# Patient Record
Sex: FEMALE | Race: WHITE | NOT HISPANIC OR LATINO
[De-identification: names, ages, dates, MRNs, and addresses within clinical notes are randomized per-mention and may not be internally consistent; named-entity substitution may affect disease eponyms.]

---

## 2017-09-06 ENCOUNTER — TRANSCRIPTION ENCOUNTER (OUTPATIENT)
Age: 67
End: 2017-09-06

## 2017-09-17 ENCOUNTER — TRANSCRIPTION ENCOUNTER (OUTPATIENT)
Age: 67
End: 2017-09-17

## 2018-10-17 ENCOUNTER — OUTPATIENT (OUTPATIENT)
Dept: OUTPATIENT SERVICES | Facility: HOSPITAL | Age: 68
LOS: 1 days | End: 2018-10-17

## 2018-10-26 ENCOUNTER — OUTPATIENT (OUTPATIENT)
Dept: OUTPATIENT SERVICES | Facility: HOSPITAL | Age: 68
LOS: 1 days | End: 2018-10-26

## 2018-12-14 ENCOUNTER — OUTPATIENT (OUTPATIENT)
Dept: OUTPATIENT SERVICES | Facility: HOSPITAL | Age: 68
LOS: 1 days | End: 2018-12-14

## 2018-12-14 ENCOUNTER — RESULT REVIEW (OUTPATIENT)
Age: 68
End: 2018-12-14

## 2018-12-31 PROBLEM — Z00.00 ENCOUNTER FOR PREVENTIVE HEALTH EXAMINATION: Status: ACTIVE | Noted: 2018-12-31

## 2019-01-22 ENCOUNTER — OUTPATIENT (OUTPATIENT)
Dept: OUTPATIENT SERVICES | Facility: HOSPITAL | Age: 69
LOS: 1 days | End: 2019-01-22

## 2019-02-11 ENCOUNTER — OUTPATIENT (OUTPATIENT)
Dept: OUTPATIENT SERVICES | Facility: HOSPITAL | Age: 69
LOS: 1 days | End: 2019-02-11

## 2019-10-30 ENCOUNTER — OUTPATIENT (OUTPATIENT)
Dept: OUTPATIENT SERVICES | Facility: HOSPITAL | Age: 69
LOS: 1 days | End: 2019-10-30
Payer: MEDICARE

## 2019-10-30 PROCEDURE — 74018 RADEX ABDOMEN 1 VIEW: CPT | Mod: 26

## 2019-10-30 PROCEDURE — 76856 US EXAM PELVIC COMPLETE: CPT | Mod: 26

## 2019-10-30 PROCEDURE — 76770 US EXAM ABDO BACK WALL COMP: CPT | Mod: 26

## 2020-02-25 ENCOUNTER — OUTPATIENT (OUTPATIENT)
Dept: OUTPATIENT SERVICES | Facility: HOSPITAL | Age: 70
LOS: 1 days | End: 2020-02-25
Payer: MEDICARE

## 2020-02-25 PROCEDURE — 74176 CT ABD & PELVIS W/O CONTRAST: CPT | Mod: 26

## 2021-07-15 ENCOUNTER — APPOINTMENT (OUTPATIENT)
Dept: OPHTHALMOLOGY | Facility: CLINIC | Age: 71
End: 2021-07-15

## 2021-07-22 ENCOUNTER — APPOINTMENT (OUTPATIENT)
Dept: ULTRASOUND IMAGING | Facility: CLINIC | Age: 71
End: 2021-07-22
Payer: MEDICARE

## 2021-07-22 ENCOUNTER — APPOINTMENT (OUTPATIENT)
Dept: RADIOLOGY | Facility: CLINIC | Age: 71
End: 2021-07-22

## 2021-07-22 PROCEDURE — 76770 US EXAM ABDO BACK WALL COMP: CPT

## 2021-07-22 PROCEDURE — 74018 RADEX ABDOMEN 1 VIEW: CPT

## 2021-08-11 ENCOUNTER — NON-APPOINTMENT (OUTPATIENT)
Age: 71
End: 2021-08-11

## 2021-08-11 ENCOUNTER — APPOINTMENT (OUTPATIENT)
Dept: OPHTHALMOLOGY | Facility: CLINIC | Age: 71
End: 2021-08-11
Payer: MEDICARE

## 2021-08-11 PROCEDURE — 99214 OFFICE O/P EST MOD 30 MIN: CPT

## 2021-08-11 PROCEDURE — 92134 CPTRZ OPH DX IMG PST SGM RTA: CPT

## 2021-08-27 ENCOUNTER — NON-APPOINTMENT (OUTPATIENT)
Age: 71
End: 2021-08-27

## 2021-08-27 ENCOUNTER — APPOINTMENT (OUTPATIENT)
Dept: OPHTHALMOLOGY | Facility: CLINIC | Age: 71
End: 2021-08-27
Payer: MEDICARE

## 2021-08-27 PROCEDURE — 92015 DETERMINE REFRACTIVE STATE: CPT

## 2021-09-17 ENCOUNTER — NON-APPOINTMENT (OUTPATIENT)
Age: 71
End: 2021-09-17

## 2021-09-17 ENCOUNTER — APPOINTMENT (OUTPATIENT)
Dept: OPHTHALMOLOGY | Facility: CLINIC | Age: 71
End: 2021-09-17
Payer: MEDICARE

## 2021-09-17 PROCEDURE — 92083 EXTENDED VISUAL FIELD XM: CPT

## 2021-09-17 PROCEDURE — 92133 CPTRZD OPH DX IMG PST SGM ON: CPT

## 2021-09-17 PROCEDURE — ZZZZZ: CPT

## 2021-09-23 ENCOUNTER — APPOINTMENT (OUTPATIENT)
Dept: RADIOLOGY | Facility: CLINIC | Age: 71
End: 2021-09-23
Payer: MEDICARE

## 2021-09-23 ENCOUNTER — APPOINTMENT (OUTPATIENT)
Dept: ULTRASOUND IMAGING | Facility: CLINIC | Age: 71
End: 2021-09-23
Payer: MEDICARE

## 2021-09-23 PROCEDURE — 76770 US EXAM ABDO BACK WALL COMP: CPT

## 2021-09-23 PROCEDURE — 74018 RADEX ABDOMEN 1 VIEW: CPT

## 2021-10-08 ENCOUNTER — APPOINTMENT (OUTPATIENT)
Dept: OPHTHALMOLOGY | Facility: CLINIC | Age: 71
End: 2021-10-08
Payer: MEDICARE

## 2021-10-08 ENCOUNTER — NON-APPOINTMENT (OUTPATIENT)
Age: 71
End: 2021-10-08

## 2021-10-08 PROCEDURE — 92014 COMPRE OPH EXAM EST PT 1/>: CPT

## 2021-10-08 PROCEDURE — 92134 CPTRZ OPH DX IMG PST SGM RTA: CPT

## 2021-10-28 ENCOUNTER — APPOINTMENT (OUTPATIENT)
Dept: MAMMOGRAPHY | Facility: CLINIC | Age: 71
End: 2021-10-28
Payer: MEDICARE

## 2021-10-28 ENCOUNTER — APPOINTMENT (OUTPATIENT)
Dept: ULTRASOUND IMAGING | Facility: CLINIC | Age: 71
End: 2021-10-28

## 2021-10-28 PROCEDURE — 76641 ULTRASOUND BREAST COMPLETE: CPT | Mod: 50

## 2021-10-28 PROCEDURE — 77066 DX MAMMO INCL CAD BI: CPT

## 2021-10-28 PROCEDURE — G0279: CPT

## 2021-11-10 ENCOUNTER — APPOINTMENT (OUTPATIENT)
Age: 71
End: 2021-11-10
Payer: MEDICARE

## 2021-11-10 VITALS
DIASTOLIC BLOOD PRESSURE: 94 MMHG | HEIGHT: 65 IN | SYSTOLIC BLOOD PRESSURE: 178 MMHG | WEIGHT: 161.05 LBS | TEMPERATURE: 97 F | BODY MASS INDEX: 26.83 KG/M2 | HEART RATE: 56 BPM | OXYGEN SATURATION: 97 %

## 2021-11-10 DIAGNOSIS — Z87.39 PERSONAL HISTORY OF OTHER DISEASES OF THE MUSCULOSKELETAL SYSTEM AND CONNECTIVE TISSUE: ICD-10-CM

## 2021-11-10 DIAGNOSIS — Z80.0 FAMILY HISTORY OF MALIGNANT NEOPLASM OF DIGESTIVE ORGANS: ICD-10-CM

## 2021-11-10 PROCEDURE — 99203 OFFICE O/P NEW LOW 30 MIN: CPT

## 2021-11-10 RX ORDER — UBIDECARENONE/VIT E ACET 100MG-5
CAPSULE ORAL
Refills: 0 | Status: ACTIVE | COMMUNITY

## 2021-11-10 RX ORDER — FOLIC ACID 20 MG
CAPSULE ORAL
Refills: 0 | Status: ACTIVE | COMMUNITY

## 2021-11-17 ENCOUNTER — APPOINTMENT (OUTPATIENT)
Dept: BREAST CENTER | Facility: CLINIC | Age: 71
End: 2021-11-17
Payer: MEDICARE

## 2021-11-17 VITALS
WEIGHT: 160 LBS | TEMPERATURE: 98 F | HEIGHT: 65 IN | BODY MASS INDEX: 26.66 KG/M2 | SYSTOLIC BLOOD PRESSURE: 139 MMHG | DIASTOLIC BLOOD PRESSURE: 90 MMHG | HEART RATE: 56 BPM

## 2021-11-17 PROCEDURE — 10005 FNA BX W/US GDN 1ST LES: CPT

## 2021-11-17 NOTE — PROCEDURE
[FreeTextEntry1] : L Cyst Aspiration [FreeTextEntry2] : Tender/palpable Cyst [FreeTextEntry3] : Cyst aspiration of L breast. Procedure and consent reviewed and signed. Pt agrees to proceed. U/s reveals 1.8 x1.1 cm simple cyst. Using 22 gauge needle 2mL lidocaine given and aspiration performed under u/s guidance with resolution of cyst. Total removed: L 2mL whitish, non suspicious fluid.  Given complete resolution of the cyst and not suspicious, fluid discarded. No complications and pt tolerated procedure well. Excellent hemostasis. Bandaid applied. Pt to f/u if cyst recurs or sooner as needed.

## 2021-11-17 NOTE — PAST MEDICAL HISTORY
[Menopause Age____] : age at menopause was [unfilled] [Total Preg ___] : G[unfilled] [Age At Live Birth ___] : Age at live birth: [unfilled] [Menarche Age ____] : age at menarche was [unfilled] [Live Births ___] : P[unfilled]  [Living ___] : Living: [unfilled] [History of Hormone Replacement Treatment] : has no history of hormone replacement treatment [FreeTextEntry6] : none [FreeTextEntry7] : Yes - ~6 years [FreeTextEntry8] : Yes - 15 months

## 2021-11-17 NOTE — ASSESSMENT
[FreeTextEntry1] : Pt is a 71 year old female referred for consultation by Ivonne Heath MD (PCP) here today for aspiration of L palpable cyst. She has had this before about 3 times in the last 20 years.  She desired aspiration given it is palpable although it has decreased in size since u/s.  \par \par 10/28/21, Kennedi Arriola dMMG: FG, no susp findings, multiple well-circumscribed masses seen in L breast, palpable lump UOQ L caleb to a 2.5cm well-circumscribed mass prev demonstrated on prior mmgs (comp to 2014 and 2018); Bilat U/S: R no susp findings, L no susp findings, 2.7cm cyst 2:00 caleb to palpable lump, rec mmg 1 year, BR2\par \par Fhx: AJ. Breast Ca - mGrandma at 75 and Mom w/ Colon Ca at 89\par Left u/s asp performed of cyst with complete resolution, 2 ml of fluid, not suspicious and discarded.\par D/c'd with pt, cyst may reform. Rec 6 mos f.u u/s, sooner if reforms sooner. \par \par

## 2021-11-17 NOTE — ASSESSMENT
[FreeTextEntry1] : Pt is a 71 year old female referred for consultation by Ivonne Heath MD (PCP) for L palpable cyst. She has had this before about 3 times in the last 20 years. She states that since she noticed it it has gotten smaller and she has not needed to take anything for the pain/discomfort. \par \par 10/28/21, Kennedi Arriola dMMG: FG, no susp findings, multiple well-circumscribed masses seen in L breast, palpable lump UOQ L caleb to a 2.5cm well-circumscribed mass prev demonstrated on prior mmgs (comp to 2014 and 2018); Bilat U/S: R no susp findings, L no susp findings, 2.7cm cyst 2:00 caleb to palpable lump, rec mmg 1 year, BR2\par \par Fhx: AJ. Breast Ca - mGrandma at 75 and Mom w/ Colon Ca at 89\par \par CBE: R negative, L UOQ lump palpable corresponding to cyst. No adenopathy. \par Reviewed recent imaging and CBE -left breast cyst, pt has had for years but occasionally flares up. Now decreased. Options of observation vs aspiration discussed. Pt opts for asp. Will do under u/s guidance given slightly deep on palpation. Discussed if aspirate is suspicious or indeterminate, may send for cytology. We will have pt f/u next Wed in [place] office for U/S guided cyst aspiration at pt's request. Pt meets NCC[facility] guidelines for MYRIAD genetic testing and pt accepts testing - [facility]AD sent today.

## 2021-11-17 NOTE — HISTORY OF PRESENT ILLNESS
[FreeTextEntry1] : Pt is a 71 year old female referred for consultation by Ivonne Heath MD (PCP) for L palpable cyst. She has had this before about 3 times in the last 20 years. She states that since she noticed it it has gotten smaller and she has not needed to take anything for the pain/discomfort. \par \par 10/28/21, Kennedi Arriola dMMG: FG, no susp findings, multiple well-circumscribed masses seen in L breast, palpable lump UOQ L caleb to a 2.5cm well-circumscribed mass prev demonstrated on prior mmgs (comp to 2014 and 2018); Kennedi U/S: R no susp findings, L no susp findings, 2.7cm cyst 2:00 caleb to palpable lump, rec mmg 1 year, BR2\par \par Fhx: AJ. Breast Ca - mGrandma at 75 and Mom w/ Colon Ca at 89\par \par

## 2021-11-17 NOTE — CONSULT LETTER
[Dear  ___] : Dear  [unfilled], [Courtesy Letter:] : I had the pleasure of seeing your patient, [unfilled], in my office today. [Please see my note below.] : Please see my note below. [Consult Closing:] : Thank you very much for allowing me to participate in the care of this patient.  If you have any questions, please do not hesitate to contact me. [Sincerely,] : Sincerely, No

## 2021-11-17 NOTE — CONSULT LETTER
[Dear  ___] : Dear  [unfilled], [Consult Letter:] : I had the pleasure of evaluating your patient, [unfilled]. [Please see my note below.] : Please see my note below. [Consult Closing:] : Thank you very much for allowing me to participate in the care of this patient.  If you have any questions, please do not hesitate to contact me. [Sincerely,] : Sincerely, [FreeTextEntry3] : Ruth Sullivan MD

## 2021-11-17 NOTE — DATA REVIEWED
[FreeTextEntry1] : 10/28/21, Kennedi Arriola dMMG: FG, no susp findings, multiple well-circumscribed masses seen in L breast, palpable lump UOQ L caleb to a 2.5cm well-circumscribed mass prev demonstrated on prior mmgs (comp to 2014 and 2018); Kennedi U/S: R no susp findings, L no susp findings, 2.7cm cyst 2:00 caleb to palpable lump, rec mmg 1 year, BR2

## 2021-11-24 ENCOUNTER — NON-APPOINTMENT (OUTPATIENT)
Age: 71
End: 2021-11-24

## 2021-12-01 ENCOUNTER — NON-APPOINTMENT (OUTPATIENT)
Age: 71
End: 2021-12-01

## 2022-04-01 ENCOUNTER — APPOINTMENT (OUTPATIENT)
Dept: ULTRASOUND IMAGING | Facility: CLINIC | Age: 72
End: 2022-04-01
Payer: MEDICARE

## 2022-04-01 ENCOUNTER — RESULT REVIEW (OUTPATIENT)
Age: 72
End: 2022-04-01

## 2022-04-01 PROCEDURE — 76642 ULTRASOUND BREAST LIMITED: CPT | Mod: LT

## 2022-04-04 ENCOUNTER — NON-APPOINTMENT (OUTPATIENT)
Age: 72
End: 2022-04-04

## 2022-04-07 ENCOUNTER — APPOINTMENT (OUTPATIENT)
Dept: BREAST CENTER | Facility: CLINIC | Age: 72
End: 2022-04-07
Payer: MEDICARE

## 2022-04-07 VITALS
DIASTOLIC BLOOD PRESSURE: 78 MMHG | SYSTOLIC BLOOD PRESSURE: 167 MMHG | HEIGHT: 65 IN | TEMPERATURE: 93.8 F | HEART RATE: 52 BPM | WEIGHT: 163 LBS | BODY MASS INDEX: 27.16 KG/M2

## 2022-04-07 DIAGNOSIS — N60.19 DIFFUSE CYSTIC MASTOPATHY OF UNSPECIFIED BREAST: ICD-10-CM

## 2022-04-07 DIAGNOSIS — Z80.3 FAMILY HISTORY OF MALIGNANT NEOPLASM OF BREAST: ICD-10-CM

## 2022-04-07 DIAGNOSIS — N60.02 SOLITARY CYST OF LEFT BREAST: ICD-10-CM

## 2022-04-07 PROCEDURE — 99213 OFFICE O/P EST LOW 20 MIN: CPT

## 2022-04-07 RX ORDER — PREDNISONE 5 MG/1
5 TABLET ORAL
Refills: 0 | Status: ACTIVE | COMMUNITY

## 2022-04-07 RX ORDER — ROSUVASTATIN CALCIUM 10 MG/1
10 TABLET, FILM COATED ORAL
Refills: 0 | Status: ACTIVE | COMMUNITY

## 2022-04-07 NOTE — DATA REVIEWED
[FreeTextEntry1] : 4/1/22, NFR, L U/S: no susp solid mass, prev cyst 2:00 3N no longer visualized, 3x2mm benign-appearing cyst 2:00 1N, add benign-appearing 2mm cyst 2:00 1N, rec resume annual mmg on schedule, BR2

## 2022-04-07 NOTE — PHYSICAL EXAM
[Normocephalic] : normocephalic [Atraumatic] : atraumatic [Supple] : supple [No Supraclavicular Adenopathy] : no supraclavicular adenopathy [No Thyromegaly] : no thyromegaly [Examined in the supine and seated position] : examined in the supine and seated position [Bra Size: ___] : Bra Size: [unfilled] [No dominant masses] : no dominant masses in right breast  [No dominant masses] : no dominant masses left breast [No Nipple Retraction] : no left nipple retraction [No Nipple Discharge] : no left nipple discharge [No Axillary Lymphadenopathy] : no left axillary lymphadenopathy [No Edema] : no edema [No Swelling] : no swelling [Full ROM] : full range of motion [No Rashes] : no rashes [No Ulceration] : no ulceration [de-identified] : STACIE

## 2022-04-07 NOTE — CONSULT LETTER
[Dear  ___] : Dear  [unfilled], [Courtesy Letter:] : I had the pleasure of seeing your patient, [unfilled], in my office today. [Please see my note below.] : Please see my note below. [Consult Closing:] : Thank you very much for allowing me to participate in the care of this patient.  If you have any questions, please do not hesitate to contact me. [Sincerely,] : Sincerely, [FreeTextEntry3] : Ruth Sullivan MD

## 2022-04-07 NOTE — PAST MEDICAL HISTORY
[Menarche Age ____] : age at menarche was [unfilled] [Menopause Age____] : age at menopause was [unfilled] [Total Preg ___] : G[unfilled] [Live Births ___] : P[unfilled]  [Living ___] : Living: [unfilled] [Age At Live Birth ___] : Age at live birth: [unfilled] [History of Hormone Replacement Treatment] : has no history of hormone replacement treatment [FreeTextEntry6] : none [FreeTextEntry7] : Yes - ~6 years [FreeTextEntry8] : Yes - 15 months

## 2022-04-07 NOTE — ASSESSMENT
[FreeTextEntry1] : Pt is a 72 year old [facility] panel negative white female here for f/u after recent imaging for L palpable cyst (aspirated 11/21).   Left aspirated cyst no longer palpable or seen on u/s.  Pt denies any breast lesions, discharge or masses.\par Pt has bad poison ivy on her face and is currently on prednisone. \par Referred by Ivonne Heath MD (PCP)\par \par 10/28/21, Kennedi Arriola dMMG: FG, no susp findings, multiple well-circumscribed masses seen in L breast, palpable lump UOQ L caleb to a 2.5cm well-circumscribed mass prev demonstrated on prior mmgs (comp to 2014 and 2018); Bilat U/S: R no susp findings, L no susp findings, 2.7cm cyst 2:00 caleb to palpable lump, rec mmg 1 year, BR2\par 4/1/22, NFR, L U/S: no susp solid mass, prev cyst 2:00 3N no longer visualized, 3x2mm benign-appearing cyst 2:00 1N, add benign-appearing 2mm cyst 2:00 1N, rec resume annual mmg on schedule, BR2\par \par Fhx: AJ. Breast Ca - mGrandma at 75 and Mom w/ Colon Ca at 89\par \par CBE: Bilat negative. No adenopathy. No palpable cyst. STACIE\par Reviewed recent L U/S - benign. Rec Bilat sMMG and U/S 10/22, F/u after or sooner as needed. \par Reviewed negative genetic test, VUS.

## 2022-04-07 NOTE — HISTORY OF PRESENT ILLNESS
[FreeTextEntry1] : Pt is a 72 year old MYRI[facility] panel negative white female here for f/u after recent imaging for L palpable cyst (aspirated 11/21).   Left aspirated cyst no longer palpable or seen on u/s.  Pt denies any breast lesions, discharge or masses.\par \par Referred by Ivonne Heath MD (PCP)\par \par 10/28/21, Kennedi Arriola dMMG: FG, no susp findings, multiple well-circumscribed masses seen in L breast, palpable lump UOQ L caleb to a 2.5cm well-circumscribed mass prev demonstrated on prior mmgs (comp to 2014 and 2018); Biljeff U/S: R no susp findings, L no susp findings, 2.7cm cyst 2:00 caleb to palpable lump, rec mmg 1 year, BR2\par 4/1/22, NFR, L U/S: no susp solid mass, prev cyst 2:00 3N no longer visualized, 3x2mm benign-appearing cyst 2:00 1N, add benign-appearing 2mm cyst 2:00 1N, rec resume annual mmg on schedule, BR2\par \par Fhx: AJ. Breast Ca - mGrandma at 75 and Mom w/ Colon Ca at 89\par  \par

## 2022-04-15 ENCOUNTER — NON-APPOINTMENT (OUTPATIENT)
Age: 72
End: 2022-04-15

## 2022-04-15 ENCOUNTER — APPOINTMENT (OUTPATIENT)
Dept: OPHTHALMOLOGY | Facility: CLINIC | Age: 72
End: 2022-04-15
Payer: MEDICARE

## 2022-04-15 PROCEDURE — 99214 OFFICE O/P EST MOD 30 MIN: CPT

## 2022-04-15 PROCEDURE — 92134 CPTRZ OPH DX IMG PST SGM RTA: CPT

## 2022-05-27 NOTE — ASU PATIENT PROFILE, ADULT - FALL HARM RISK - HARM RISK INTERVENTIONS
Communicate Risk of Fall with Harm to all staff/Reinforce activity limits and safety measures with patient and family/Review medications for side effects contributing to fall risk/Sit up slowly, dangle for a short time, stand at bedside before walking/Tailored Fall Risk Interventions/Visual Cue: Yellow wristband and red socks/Bed in lowest position, wheels locked, appropriate side rails in place/Call bell, personal items and telephone in reach/Instruct patient to call for assistance before getting out of bed or chair/Non-slip footwear when patient is out of bed/[place] to call system/Physically safe environment - no spills, clutter or unnecessary equipment/Purposeful Proactive Rounding/Room/bathroom lighting operational, light cord in reach

## 2022-05-30 ENCOUNTER — TRANSCRIPTION ENCOUNTER (OUTPATIENT)
Age: 72
End: 2022-05-30

## 2022-05-31 ENCOUNTER — OUTPATIENT (OUTPATIENT)
Dept: OUTPATIENT SERVICES | Facility: HOSPITAL | Age: 72
LOS: 1 days | End: 2022-05-31
Payer: MEDICARE

## 2022-05-31 ENCOUNTER — TRANSCRIPTION ENCOUNTER (OUTPATIENT)
Age: 72
End: 2022-05-31

## 2022-05-31 VITALS
RESPIRATION RATE: 14 BRPM | DIASTOLIC BLOOD PRESSURE: 75 MMHG | TEMPERATURE: 98 F | OXYGEN SATURATION: 97 % | HEIGHT: 66 IN | WEIGHT: 162.26 LBS | SYSTOLIC BLOOD PRESSURE: 158 MMHG | HEART RATE: 59 BPM

## 2022-05-31 VITALS
HEART RATE: 74 BPM | OXYGEN SATURATION: 96 % | SYSTOLIC BLOOD PRESSURE: 142 MMHG | DIASTOLIC BLOOD PRESSURE: 64 MMHG | RESPIRATION RATE: 15 BRPM

## 2022-05-31 DIAGNOSIS — Z90.89 ACQUIRED ABSENCE OF OTHER ORGANS: Chronic | ICD-10-CM

## 2022-05-31 DIAGNOSIS — H35.341 MACULAR CYST, HOLE, OR PSEUDOHOLE, RIGHT EYE: ICD-10-CM

## 2022-05-31 DIAGNOSIS — Z98.891 HISTORY OF UTERINE SCAR FROM PREVIOUS SURGERY: Chronic | ICD-10-CM

## 2022-05-31 DIAGNOSIS — Z98.890 OTHER SPECIFIED POSTPROCEDURAL STATES: Chronic | ICD-10-CM

## 2022-05-31 PROCEDURE — 67042 VIT FOR MACULAR HOLE: CPT | Mod: RT

## 2022-05-31 PROCEDURE — C1889: CPT

## 2022-05-31 DEVICE — GS SF6 SULFER HEXAFLUORIDE 3 L 20GM
Type: IMPLANTABLE DEVICE | Site: RIGHT | Status: NON-FUNCTIONAL
Removed: 2022-05-31

## 2022-05-31 NOTE — ASU DISCHARGE PLAN (ADULT/PEDIATRIC) - NS MD DC FALL RISK RISK
For information on Fall & Injury Prevention, visit: [email or web address] OR  [email or web address] OR  https://www.cdc.gov/steadi/patient.html

## 2022-05-31 NOTE — ASU DISCHARGE PLAN (ADULT/PEDIATRIC) - CARE PROVIDER_API CALL
Kelby Kaur (MD)  Ophthalmology  [address] [place] [address], [address]  [place] [address]  Phone: [phone]  Fax: [phone]  Scheduled Appointment: 06/01/2022 09:30 AM

## 2022-08-26 PROBLEM — E78.00 PURE HYPERCHOLESTEROLEMIA, UNSPECIFIED: Chronic | Status: ACTIVE | Noted: 2022-05-31

## 2022-08-26 PROBLEM — M81.0 AGE-RELATED OSTEOPOROSIS WITHOUT CURRENT PATHOLOGICAL FRACTURE: Chronic | Status: ACTIVE | Noted: 2022-05-31

## 2022-08-30 ENCOUNTER — APPOINTMENT (OUTPATIENT)
Dept: ULTRASOUND IMAGING | Facility: CLINIC | Age: 72
End: 2022-08-30

## 2022-08-30 PROCEDURE — 76770 US EXAM ABDO BACK WALL COMP: CPT

## 2023-01-27 ENCOUNTER — NON-APPOINTMENT (OUTPATIENT)
Age: 73
End: 2023-01-27

## 2023-01-27 ENCOUNTER — APPOINTMENT (OUTPATIENT)
Dept: OPHTHALMOLOGY | Facility: CLINIC | Age: 73
End: 2023-01-27
Payer: MEDICARE

## 2023-01-27 PROCEDURE — 92014 COMPRE OPH EXAM EST PT 1/>: CPT

## 2023-03-08 ENCOUNTER — NON-APPOINTMENT (OUTPATIENT)
Age: 73
End: 2023-03-08

## 2023-03-08 ENCOUNTER — APPOINTMENT (OUTPATIENT)
Dept: OPHTHALMOLOGY | Facility: CLINIC | Age: 73
End: 2023-03-08
Payer: MEDICARE

## 2023-03-08 PROCEDURE — 92012 INTRM OPH EXAM EST PATIENT: CPT

## 2023-03-08 PROCEDURE — 92136 OPHTHALMIC BIOMETRY: CPT

## 2023-03-21 ENCOUNTER — APPOINTMENT (OUTPATIENT)
Dept: OPHTHALMOLOGY | Facility: HOSPITAL | Age: 73
End: 2023-03-21
Payer: MEDICARE

## 2023-03-21 ENCOUNTER — NON-APPOINTMENT (OUTPATIENT)
Age: 73
End: 2023-03-21

## 2023-03-21 PROCEDURE — 66984 XCAPSL CTRC RMVL W/O ECP: CPT | Mod: RT

## 2023-03-22 ENCOUNTER — NON-APPOINTMENT (OUTPATIENT)
Age: 73
End: 2023-03-22

## 2023-03-22 ENCOUNTER — APPOINTMENT (OUTPATIENT)
Dept: OPHTHALMOLOGY | Facility: CLINIC | Age: 73
End: 2023-03-22
Payer: MEDICARE

## 2023-03-22 PROCEDURE — 99024 POSTOP FOLLOW-UP VISIT: CPT

## 2023-03-31 ENCOUNTER — NON-APPOINTMENT (OUTPATIENT)
Age: 73
End: 2023-03-31

## 2023-03-31 ENCOUNTER — APPOINTMENT (OUTPATIENT)
Dept: OPHTHALMOLOGY | Facility: CLINIC | Age: 73
End: 2023-03-31
Payer: MEDICARE

## 2023-03-31 PROCEDURE — 99024 POSTOP FOLLOW-UP VISIT: CPT

## 2023-04-27 ENCOUNTER — APPOINTMENT (OUTPATIENT)
Dept: OPHTHALMOLOGY | Facility: CLINIC | Age: 73
End: 2023-04-27
Payer: MEDICARE

## 2023-04-27 ENCOUNTER — NON-APPOINTMENT (OUTPATIENT)
Age: 73
End: 2023-04-27

## 2023-04-27 ENCOUNTER — APPOINTMENT (OUTPATIENT)
Dept: OPHTHALMOLOGY | Facility: CLINIC | Age: 73
End: 2023-04-27
Payer: SELF-PAY

## 2023-04-27 PROCEDURE — 92134 CPTRZ OPH DX IMG PST SGM RTA: CPT

## 2023-04-27 PROCEDURE — 92015 DETERMINE REFRACTIVE STATE: CPT

## 2023-04-27 PROCEDURE — 99024 POSTOP FOLLOW-UP VISIT: CPT

## 2023-06-09 ENCOUNTER — APPOINTMENT (OUTPATIENT)
Dept: ULTRASOUND IMAGING | Facility: CLINIC | Age: 73
End: 2023-06-09
Payer: MEDICARE

## 2023-06-09 PROCEDURE — 76770 US EXAM ABDO BACK WALL COMP: CPT

## 2023-08-24 ENCOUNTER — APPOINTMENT (OUTPATIENT)
Dept: OPHTHALMOLOGY | Facility: CLINIC | Age: 73
End: 2023-08-24
Payer: MEDICARE

## 2023-08-24 ENCOUNTER — NON-APPOINTMENT (OUTPATIENT)
Age: 73
End: 2023-08-24

## 2023-08-24 PROCEDURE — 92134 CPTRZ OPH DX IMG PST SGM RTA: CPT

## 2023-08-24 PROCEDURE — 92014 COMPRE OPH EXAM EST PT 1/>: CPT

## 2023-08-28 ENCOUNTER — APPOINTMENT (OUTPATIENT)
Dept: RADIOLOGY | Facility: CLINIC | Age: 73
End: 2023-08-28
Payer: MEDICARE

## 2023-08-28 PROCEDURE — 77080 DXA BONE DENSITY AXIAL: CPT

## 2024-02-29 ENCOUNTER — APPOINTMENT (OUTPATIENT)
Dept: OPHTHALMOLOGY | Facility: CLINIC | Age: 74
End: 2024-02-29
Payer: MEDICARE

## 2024-02-29 ENCOUNTER — NON-APPOINTMENT (OUTPATIENT)
Age: 74
End: 2024-02-29

## 2024-02-29 PROCEDURE — 92014 COMPRE OPH EXAM EST PT 1/>: CPT

## 2024-02-29 PROCEDURE — 92134 CPTRZ OPH DX IMG PST SGM RTA: CPT

## 2024-04-08 ENCOUNTER — APPOINTMENT (OUTPATIENT)
Dept: MAMMOGRAPHY | Facility: CLINIC | Age: 74
End: 2024-04-08
Payer: MEDICARE

## 2024-04-08 PROCEDURE — G0279: CPT

## 2024-04-08 PROCEDURE — 77066 DX MAMMO INCL CAD BI: CPT

## 2024-04-10 ENCOUNTER — APPOINTMENT (OUTPATIENT)
Dept: ULTRASOUND IMAGING | Facility: CLINIC | Age: 74
End: 2024-04-10
Payer: MEDICARE

## 2024-04-10 PROCEDURE — 76642 ULTRASOUND BREAST LIMITED: CPT | Mod: 50

## 2024-04-15 ENCOUNTER — APPOINTMENT (OUTPATIENT)
Dept: ULTRASOUND IMAGING | Facility: CLINIC | Age: 74
End: 2024-04-15
Payer: MEDICARE

## 2024-04-15 PROCEDURE — 19083 BX BREAST 1ST LESION US IMAG: CPT | Mod: RT

## 2024-04-15 PROCEDURE — 77065 DX MAMMO INCL CAD UNI: CPT | Mod: RT

## 2024-04-15 PROCEDURE — A4648: CPT

## 2024-09-11 ENCOUNTER — NON-APPOINTMENT (OUTPATIENT)
Age: 74
End: 2024-09-11

## 2024-09-11 ENCOUNTER — APPOINTMENT (OUTPATIENT)
Dept: OPHTHALMOLOGY | Facility: CLINIC | Age: 74
End: 2024-09-11

## 2024-09-11 PROCEDURE — 92014 COMPRE OPH EXAM EST PT 1/>: CPT

## 2024-10-11 ENCOUNTER — APPOINTMENT (OUTPATIENT)
Dept: ULTRASOUND IMAGING | Facility: CLINIC | Age: 74
End: 2024-10-11

## 2024-10-11 ENCOUNTER — APPOINTMENT (OUTPATIENT)
Dept: ULTRASOUND IMAGING | Facility: CLINIC | Age: 74
End: 2024-10-11
Payer: MEDICARE

## 2024-10-11 PROCEDURE — 76700 US EXAM ABDOM COMPLETE: CPT

## 2024-10-15 ENCOUNTER — APPOINTMENT (OUTPATIENT)
Dept: ULTRASOUND IMAGING | Facility: CLINIC | Age: 74
End: 2024-10-15
Payer: MEDICARE

## 2024-10-15 ENCOUNTER — APPOINTMENT (OUTPATIENT)
Dept: MAMMOGRAPHY | Facility: CLINIC | Age: 74
End: 2024-10-15

## 2024-10-15 PROCEDURE — G0279: CPT | Mod: RT

## 2024-10-15 PROCEDURE — 77065 DX MAMMO INCL CAD UNI: CPT | Mod: RT

## 2024-10-15 PROCEDURE — 76857 US EXAM PELVIC LIMITED: CPT

## 2024-12-05 PROBLEM — Z01.419 ENCOUNTER FOR WELL WOMAN EXAM WITH ROUTINE GYNECOLOGICAL EXAM: Status: ACTIVE | Noted: 2024-12-05

## 2024-12-09 ENCOUNTER — TRANSCRIPTION ENCOUNTER (OUTPATIENT)
Age: 74
End: 2024-12-09

## 2024-12-09 ENCOUNTER — APPOINTMENT (OUTPATIENT)
Dept: OBGYN | Facility: CLINIC | Age: 74
End: 2024-12-09
Payer: MEDICARE

## 2024-12-09 VITALS
HEIGHT: 65 IN | SYSTOLIC BLOOD PRESSURE: 123 MMHG | DIASTOLIC BLOOD PRESSURE: 81 MMHG | WEIGHT: 146 LBS | BODY MASS INDEX: 24.32 KG/M2

## 2024-12-09 DIAGNOSIS — Z01.419 ENCOUNTER FOR GYNECOLOGICAL EXAMINATION (GENERAL) (ROUTINE) W/OUT ABNORMAL FINDINGS: ICD-10-CM

## 2024-12-09 PROCEDURE — G0101: CPT

## 2024-12-09 PROCEDURE — 99202 OFFICE O/P NEW SF 15 MIN: CPT | Mod: 25

## 2024-12-11 LAB — HPV HIGH+LOW RISK DNA PNL CVX: NOT DETECTED

## 2024-12-15 LAB — CYTOLOGY CVX/VAG DOC THIN PREP: ABNORMAL

## 2025-01-21 ENCOUNTER — APPOINTMENT (OUTPATIENT)
Dept: OBGYN | Facility: CLINIC | Age: 75
End: 2025-01-21

## 2025-01-21 ENCOUNTER — APPOINTMENT (OUTPATIENT)
Dept: ANTEPARTUM | Facility: CLINIC | Age: 75
End: 2025-01-21

## 2025-03-03 ENCOUNTER — NON-APPOINTMENT (OUTPATIENT)
Age: 75
End: 2025-03-03

## 2025-03-03 ENCOUNTER — APPOINTMENT (OUTPATIENT)
Dept: OPHTHALMOLOGY | Facility: CLINIC | Age: 75
End: 2025-03-03
Payer: MEDICARE

## 2025-03-03 PROCEDURE — 92133 CPTRZD OPH DX IMG PST SGM ON: CPT

## 2025-03-03 PROCEDURE — 92083 EXTENDED VISUAL FIELD XM: CPT

## 2025-03-06 ENCOUNTER — APPOINTMENT (OUTPATIENT)
Dept: OPHTHALMOLOGY | Facility: CLINIC | Age: 75
End: 2025-03-06
Payer: MEDICARE

## 2025-03-06 ENCOUNTER — APPOINTMENT (OUTPATIENT)
Dept: OPHTHALMOLOGY | Facility: CLINIC | Age: 75
End: 2025-03-06
Payer: SELF-PAY

## 2025-03-06 ENCOUNTER — NON-APPOINTMENT (OUTPATIENT)
Age: 75
End: 2025-03-06

## 2025-03-06 PROCEDURE — 92134 CPTRZ OPH DX IMG PST SGM RTA: CPT

## 2025-03-06 PROCEDURE — 92014 COMPRE OPH EXAM EST PT 1/>: CPT

## 2025-03-06 PROCEDURE — 92015 DETERMINE REFRACTIVE STATE: CPT

## 2025-09-02 ENCOUNTER — NON-APPOINTMENT (OUTPATIENT)
Age: 75
End: 2025-09-02

## 2025-09-04 ENCOUNTER — APPOINTMENT (OUTPATIENT)
Dept: OPHTHALMOLOGY | Facility: CLINIC | Age: 75
End: 2025-09-04

## (undated) DEVICE — LENS DISP FLAT VIT

## (undated) DEVICE — NDL HYPO SAFE 22G X 1.5"

## (undated) DEVICE — AUTO GAS FILL CONSTELLATION

## (undated) DEVICE — SUT VICRYL 7-0 12" TG140-8 DA

## (undated) DEVICE — DRAPE STERI-DRAPE INCISE 13X13"

## (undated) DEVICE — WRAP COMPRESSION CALF MED

## (undated) DEVICE — BLANKET WARMER LOWER ADULT

## (undated) DEVICE — Device

## (undated) DEVICE — LIGHT SHIELD CORNEAL

## (undated) DEVICE — CANNULA MEDONE FLEXTIP 25G

## (undated) DEVICE — CONSTELLATION TOTAL PLUS PAK 23G

## (undated) DEVICE — DRSG MASTISOL

## (undated) DEVICE — GLV 7 PROTEXIS

## (undated) DEVICE — SYE-CONSTELLATION MACHINE 1101051201X: Type: DURABLE MEDICAL EQUIPMENT

## (undated) DEVICE — SOL IRR BAL SALT + 500ML

## (undated) DEVICE — ALCON FORCEPS SHARKSKIN ILM 25G

## (undated) DEVICE — CANNULA MEDONE FLEXTIP 23G